# Patient Record
Sex: FEMALE | Race: ASIAN
[De-identification: names, ages, dates, MRNs, and addresses within clinical notes are randomized per-mention and may not be internally consistent; named-entity substitution may affect disease eponyms.]

---

## 2019-10-27 ENCOUNTER — HOSPITAL ENCOUNTER (INPATIENT)
Dept: HOSPITAL 25 - ED | Age: 24
Discharge: HOME | DRG: 897 | End: 2019-10-27
Attending: INTERNAL MEDICINE | Admitting: INTERNAL MEDICINE
Payer: COMMERCIAL

## 2019-10-27 VITALS — SYSTOLIC BLOOD PRESSURE: 120 MMHG | DIASTOLIC BLOOD PRESSURE: 78 MMHG

## 2019-10-27 DIAGNOSIS — E87.6: ICD-10-CM
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
## 2019-10-27 DIAGNOSIS — R41.82: ICD-10-CM

## 2019-10-27 DIAGNOSIS — F10.129: Primary | ICD-10-CM

## 2019-10-27 LAB
ALBUMIN SERPL BCG-MCNC: 3.9 G/DL (ref 3.2–5.2)
ALBUMIN SERPL BCG-MCNC: 4.2 G/DL (ref 3.2–5.2)
ALBUMIN/GLOB SERPL: 1.5 {RATIO} (ref 1–3)
ALBUMIN/GLOB SERPL: 1.6 {RATIO} (ref 1–3)
ALP SERPL-CCNC: 61 U/L (ref 34–104)
ALP SERPL-CCNC: 63 U/L (ref 34–104)
ALT SERPL W P-5'-P-CCNC: 11 U/L (ref 7–52)
ALT SERPL W P-5'-P-CCNC: 12 U/L (ref 7–52)
ANION GAP SERPL CALC-SCNC: 10 MMOL/L (ref 2–11)
ANION GAP SERPL CALC-SCNC: 7 MMOL/L (ref 2–11)
APAP SERPL-MCNC: < 15 MCG/ML
AST SERPL-CCNC: 20 U/L (ref 13–39)
AST SERPL-CCNC: 22 U/L (ref 13–39)
BASOPHILS # BLD AUTO: 0 10^3/UL (ref 0–0.2)
BASOPHILS # BLD AUTO: 0.1 10^3/UL (ref 0–0.2)
BUN SERPL-MCNC: 11 MG/DL (ref 6–24)
BUN SERPL-MCNC: 7 MG/DL (ref 6–24)
BUN/CREAT SERPL: 13 (ref 8–20)
BUN/CREAT SERPL: 16.9 (ref 8–20)
CALCIUM SERPL-MCNC: 7.7 MG/DL (ref 8.6–10.3)
CALCIUM SERPL-MCNC: 9 MG/DL (ref 8.6–10.3)
CHLORIDE SERPL-SCNC: 103 MMOL/L (ref 101–111)
CHLORIDE SERPL-SCNC: 108 MMOL/L (ref 101–111)
EOSINOPHIL # BLD AUTO: 0 10^3/UL (ref 0–0.6)
EOSINOPHIL # BLD AUTO: 0.1 10^3/UL (ref 0–0.6)
GLOBULIN SER CALC-MCNC: 2.4 G/DL (ref 2–4)
GLOBULIN SER CALC-MCNC: 2.8 G/DL (ref 2–4)
GLUCOSE SERPL-MCNC: 124 MG/DL (ref 70–100)
GLUCOSE SERPL-MCNC: 165 MG/DL (ref 70–100)
HCG SERPL QL: < 0.6 MIU/ML
HCO3 SERPL-SCNC: 22 MMOL/L (ref 22–32)
HCO3 SERPL-SCNC: 23 MMOL/L (ref 22–32)
HCT VFR BLD AUTO: 34 % (ref 35–47)
HCT VFR BLD AUTO: 35 % (ref 35–47)
HGB BLD-MCNC: 11.3 G/DL (ref 12–16)
HGB BLD-MCNC: 11.5 G/DL (ref 12–16)
LYMPHOCYTES # BLD AUTO: 0.9 10^3/UL (ref 1–4.8)
LYMPHOCYTES # BLD AUTO: 3 10^3/UL (ref 1–4.8)
MAGNESIUM SERPL-MCNC: 1.7 MG/DL (ref 1.9–2.7)
MCH RBC QN AUTO: 27 PG (ref 27–31)
MCH RBC QN AUTO: 28 PG (ref 27–31)
MCHC RBC AUTO-ENTMCNC: 32 G/DL (ref 31–36)
MCHC RBC AUTO-ENTMCNC: 33 G/DL (ref 31–36)
MCV RBC AUTO: 85 FL (ref 80–97)
MCV RBC AUTO: 86 FL (ref 80–97)
MONOCYTES # BLD AUTO: 0.1 10^3/UL (ref 0–0.8)
MONOCYTES # BLD AUTO: 0.4 10^3/UL (ref 0–0.8)
NEUTROPHILS # BLD AUTO: 3.7 10^3/UL (ref 1.5–7.7)
NEUTROPHILS # BLD AUTO: 5.6 10^3/UL (ref 1.5–7.7)
NRBC # BLD AUTO: 0 10^3/UL
NRBC # BLD AUTO: 0 10^3/UL
NRBC BLD QL AUTO: 0
NRBC BLD QL AUTO: 0
PLATELET # BLD AUTO: 179 10^3/UL (ref 150–450)
PLATELET # BLD AUTO: 251 10^3/UL (ref 150–450)
POTASSIUM SERPL-SCNC: 3.4 MMOL/L (ref 3.5–5)
POTASSIUM SERPL-SCNC: 4.2 MMOL/L (ref 3.5–5)
PROT SERPL-MCNC: 6.3 G/DL (ref 6.4–8.9)
PROT SERPL-MCNC: 7 G/DL (ref 6.4–8.9)
RBC # BLD AUTO: 3.99 10^6 /UL (ref 3.7–4.87)
RBC # BLD AUTO: 4.19 10^6 /UL (ref 3.7–4.87)
SALICYLATES SERPL-MCNC: < 2.5 MG/DL (ref ?–30)
SODIUM SERPL-SCNC: 135 MMOL/L (ref 135–145)
SODIUM SERPL-SCNC: 138 MMOL/L (ref 135–145)
TSH SERPL-ACNC: 4.59 MCIU/ML (ref 0.34–5.6)
VIT C UR QL: (no result)
WBC # BLD AUTO: 6.7 10^3/UL (ref 3.5–10.8)
WBC # BLD AUTO: 7.2 10^3/UL (ref 3.5–10.8)

## 2019-10-27 PROCEDURE — 82803 BLOOD GASES ANY COMBINATION: CPT

## 2019-10-27 PROCEDURE — 80076 HEPATIC FUNCTION PANEL: CPT

## 2019-10-27 PROCEDURE — 96361 HYDRATE IV INFUSION ADD-ON: CPT

## 2019-10-27 PROCEDURE — 0BH17EZ INSERTION OF ENDOTRACHEAL AIRWAY INTO TRACHEA, VIA NATURAL OR ARTIFICIAL OPENING: ICD-10-PCS | Performed by: INTERNAL MEDICINE

## 2019-10-27 PROCEDURE — 87641 MR-STAPH DNA AMP PROBE: CPT

## 2019-10-27 PROCEDURE — 71045 X-RAY EXAM CHEST 1 VIEW: CPT

## 2019-10-27 PROCEDURE — 80053 COMPREHEN METABOLIC PANEL: CPT

## 2019-10-27 PROCEDURE — 80048 BASIC METABOLIC PNL TOTAL CA: CPT

## 2019-10-27 PROCEDURE — 84702 CHORIONIC GONADOTROPIN TEST: CPT

## 2019-10-27 PROCEDURE — 85025 COMPLETE CBC W/AUTO DIFF WBC: CPT

## 2019-10-27 PROCEDURE — 83735 ASSAY OF MAGNESIUM: CPT

## 2019-10-27 PROCEDURE — 84443 ASSAY THYROID STIM HORMONE: CPT

## 2019-10-27 PROCEDURE — G0480 DRUG TEST DEF 1-7 CLASSES: HCPCS

## 2019-10-27 PROCEDURE — 96374 THER/PROPH/DIAG INJ IV PUSH: CPT

## 2019-10-27 PROCEDURE — 80307 DRUG TEST PRSMV CHEM ANLYZR: CPT

## 2019-10-27 PROCEDURE — 96375 TX/PRO/DX INJ NEW DRUG ADDON: CPT

## 2019-10-27 PROCEDURE — 80320 DRUG SCREEN QUANTALCOHOLS: CPT

## 2019-10-27 PROCEDURE — 81003 URINALYSIS AUTO W/O SCOPE: CPT

## 2019-10-27 PROCEDURE — 5A1935Z RESPIRATORY VENTILATION, LESS THAN 24 CONSECUTIVE HOURS: ICD-10-PCS | Performed by: INTERNAL MEDICINE

## 2019-10-27 PROCEDURE — 36415 COLL VENOUS BLD VENIPUNCTURE: CPT

## 2019-10-27 PROCEDURE — 83605 ASSAY OF LACTIC ACID: CPT

## 2019-10-27 PROCEDURE — 93005 ELECTROCARDIOGRAM TRACING: CPT

## 2019-10-27 PROCEDURE — 80329 ANALGESICS NON-OPIOID 1 OR 2: CPT

## 2019-10-27 PROCEDURE — 99285 EMERGENCY DEPT VISIT HI MDM: CPT

## 2019-10-27 NOTE — HP
ADMISSION HISTORY AND PHYSICAL:

 

DATE OF ADMISSION:  10/27/19

 

CHIEF COMPLAINT:  Altered mental status.

 

HISTORY OF PRESENT ILLNESS:  This patient was intubated and there was no family 
member and there is no phone number on the chart, so history was obtained by 
reviewing the ED records.  The patient is a 24-year-old female, who arrived via 
private vehicle highly intoxicated, was not responsible to sternal rub and 
further history could not be obtained.  She was very critical and was seen by 
the ER physician who immediately intubated her. The patient is a [employer] 
student.  Apparently, initially her friend came with the patient but then now 
there is no friend at bedside.  No further history could be obtained at this 
point as the patient is intubated.

 

PAST MEDICAL HISTORY:  Unable to obtain given the patient is intubated and 
there is no friend at bedside.

 

ALLERGIES:  Unable to get and none documented.

 

FAMILY HISTORY:  Unable to obtain.

 

SOCIAL HISTORY:  Unable to obtain.

 

REVIEW OF SYSTEMS:  Unable to obtain.

 

                               PHYSICAL EXAMINATION

 

GENERAL:  The patient was sedated on propofol, required 2 restrains as she was 
very agitated at times.

 

VITAL SIGNS:  BP was noted to be 106/74, heart rate 66, respiration rate 16, 
saturating 100% on ventilator with FiO2 of 25% with end-tidal CO2 of 38.  BMI 
was noted to be 15.1

 

MENTAL STATUS:  The patient was sedated, on the vent.

 

HEAD AND NECK:  Atraumatic, normocephalic.  Bilateral pupils were reactive.  
Oral mucosa was dry.

 

NECK:  Supple.  No jugular venous distention.

 

LUNGS:  Clear to auscultation bilaterally.  No wheezing, rhonchi, or rales.

 

HEART:  S1, S2.  Regular rate and rhythm.

 

ABDOMEN:  Soft, nontender, nondistended.

 

EXTREMITIES:  No cyanosis, clubbing, or edema.

 

 DIAGNOSTIC STUDIES/LABORATORY DATA:  CBC was unremarkable except for some mild 
anemia with hemoglobin of 11.5.  ABG initially showed pH of 7.28, pCO2 of 41, 
PaO2 of 184, and oxygen saturation of 98.9 on continuous mechanical 
ventilation. Comprehensive metabolic panel shows low potassium of 3.4, bicarb 
was noted to be low end of normal at 22, creatinine normal at 0.65, BUN was 
noted to be 11.  Random glucose was noted to be elevated at 124, lactic acid 
was noted to be 2.9.  LFTs unremarkable.  Beta hCG was noted to be 
undetectable.  TSH was noted to be normal. Urinalysis shows trace ketones, 
negative for any leuk esterase or nitrite.  There is ascorbic acid present.  
Toxicology shows serum alcohol level of 399, Tylenol level was undetectable, 
and salicylate level was undetectable.  Urine drug screen was noted to be 
negative.

 

Portable chest x-ray postintubation shows ET tube to be almost roughly 8 to 9 
cm above the christina, but otherwise clear lung fields.

 

EKG shows sinus bradycardia at 47 beats per minute.

 

IMPRESSION:  This is a 24-year-old female, appears to be anorexic, came in with 
altered mental status in highly intoxicated state with serum alcohol level of 
399. There is no history that could be obtained at this point as none of her 
friends are no longer anywhere around the bedside, needed to be intubated for 
airway protection.

 

ASSESSMENT AND PLAN:

1.  Alerted mental status secondary to acute intoxication, intubated for airway 
protection, we will continue with vent management.  We will advanced the ET 
tube another 2 cm as the ET tube was very high on the chest x-ray.  We will 
repeat lactic acid level as this was elevated originally and start the patient 
on WAM protocol.  We will also start the patient on banana bag and IV thiamine.
  We will attempt weaning once the patient is more arousable and attempt 
weaning.

2.  Alcohol intoxication, questionable if the patient is just on acute 
alcoholic versus a chronic drinker.  Although the AST/ALT ratio does not show 
any elevation, we will repeat the LFTs for morning lab to see if there is any 
change.  In the meantime, we will start the patient on a WAM protocol, although 
I suspect this is a one time thing and the patient is not chronically addicted 
based on her LFTs.

3.  Hypokalemia, we will replace potassium via IV.

4.  DVT prophylaxis with sequential compression device.

5.  Code status:  Full code.  Given her young age, cannot find any surrogate 
decision maker at this point.  We will try to obtain more information from 
[place] in the morning or if the patient is more awake and following commands.

 

 

 

[id number] #: [id number]

[facility]

## 2019-10-27 NOTE — ED
Substance Abuse/Use





- HPI Summary


HPI Summary: 


LEVEL 5 CAVEAT: HPI UNOBTAINABLE DUE TO PATIENT UNRESPONSIVE. 











This patient is a 24 year old female presenting to [facility] with a chief complaint 

of unresponsiveness secondary to ETOH intoxication. The patient was brought in 

by private vehicle on responsive and the friend stated she drank too much too 

fast. 





- History Of Current Complaint


Stated Complaint: ETOH PER FRIEND


Hx Obtained From: Other: - Friend


Hx From Patient Unobtainable Due To: Altered Mental Status





- Allergies/Home Medications


Allergies/Adverse Reactions: 


 Allergies











Allergy/AdvReac Type Severity Reaction Status Date / Time


 


Unable to Assess Allergy   Verified 10/27/19 01:20











Home Medications: 


 Home Medications





NK [No Home Medications Reported]  10/27/19 [History Confirmed 10/27/19]











PMH/Surg Hx/FS Hx/Imm Hx





- Additional Comments


History Additional Comments: 





LEVEL 5 CAVEAT: PMH UNOBTAINABLE DUE TO PATIENT UNRESPONSIVE.





Review of Systems





- ROS Summary


Review of Systems Summary: 





LEVEL 5 CAVEAT: ROS UNOBTAINABLE DUE TO PATIENT UNRESPONSIVE.


Neurological: Other - Unresponsive


All Other Systems Reviewed And Are Negative: No





Physical Exam





- Summary


Physical Exam Summary: 








General: Extremely thin  FEMALE. Unresponsive. No response to sternal rub. 

Respirations 10. HR 60 BPM. Unable to assess Neurological or Psych. 


HEENT: Normocephalic, Atraumatic. 


              Eyes: Conjuctiva normal, Pupils 3mm bilaterally, sluggish. 


              Ears: TMs within normal limits.


              Nares: (-) discharge, (-) erythema.


              Oropharynx: Clear, mucous membranes moist, (-) exudates. 


Neck: Soft, FROM, (-) lymphadenopathy, (-) thyromegaly, (-) JVD.


Cardiovascular: Normal sinus rhythm, (-) murmur.


Lungs: Clear to auscultation bilaterally (-) wheezes, (-) rales, (-) rhonchi.


Abdomen: Soft, non-tender, non-distended, (-) organomegaly, normal bowel sounds.


Back: (-) CVA tenderness


Extremities: No edema.


Skin: Warm, dry, (-) rash.


Neuro: Unable to assess Neurological or Psych. 


Psychiatric: Unable to assess Neurological or Psych. 


GCS: 5 


1 Eye-opening Response 1 Verbal Response 3 Motor Response 


Triage Information Reviewed: Yes


Vital Signs On Initial Exam: 





 











Temp Pulse Resp BP Pulse Ox


 


 96.1 F   105   12   92/51   98 


 


 10/27/19 01:25  10/27/19 01:25  10/27/19 01:25  10/27/19 01:25  10/27/19 01:25











Vital Signs Reviewed: Yes





Procedures





- Sedation


Patient Received Moderate/Deep Sedation with Procedure: No





- Intubation


Time of Intubation: 01:17


Intubation Method: orotracheal


Tube Size (cm): 7.0


Medications: Succinylcholine - 60 mg


Breath Sounds after Intubation: equal


Intubation Complications: vomited - 2nd attempt successful


Post Intubation Xray: Yes


Progress/Xray Impression: Good placement noted. 





Diagnostics





- Vital Signs





 











Temp Pulse Resp BP Pulse Ox


 


 96.1 F   105   12   92/51   98 


 


 10/27/19 01:25  10/27/19 01:25  10/27/19 01:25  10/27/19 01:25  10/27/19 01:25














- Laboratory


Result Diagrams: 


 10/27/19 01:17





 10/27/19 01:17


Lab Statement: Any lab studies that have been ordered have been reviewed, and 

results considered in the medical decision making process.





- Radiology


  ** CXR


Radiology Interpretation Completed By: ED Physician


Summary of Radiographic Findings: AT Tube is acceptable in placement. No signs 

of infiltrate or fluid. Pending official radiologist report.





- EKG


  ** 0250


Cardiac Rate: Bradycardia - 47 BPM


EKG Rhythm: Sinus Rhythm


Summary of EKG Findings: No STEMI. ED Physician has reviewed and interpreted 

this EKG.





Re-Evaluation





- Re-Evaluation


  ** First Eval


Re-Evaluation Time: 02:23


Comment: Increased Propofol to 15 mg. Ativan prn.





Course/Dx





- Course


Course Of Treatment: 24-year-old female presents by private vehicle 

unresponsive.  Patient unresponsive to firm sternal rub.  Decreased 

respirations.  GCS 5.  Patient intubated with succinylcholine.  Started on the 

ventilator.  Patient had vomiting 1 at initial intubation.  Zofran and fluids 

given.  Blood alcohol returned 399.  Patient was sedated with propofol.  

Referred to hospitalist for admission.





- Diagnoses


Provider Diagnoses: 


 Unresponsive episode, Acute alcohol intoxication








- Critical Care Time


Critical Care Time:  min - 77 mins





Discharge ED





- Sign-Out/Discharge


Documenting (check all that apply): Patient Departure - Admission





- Discharge Plan


Condition: Stable


Disposition: ADMITTED TO [facility]





- Billing Disposition and Condition


Condition: STABLE


Disposition: Admitted to [facility]





- Attestation Statements


Document Initiated by Dariuszibvalrei: Yes


Documenting Scribe: Jayme Blount


Provider For Whom Neymar is Documenting (Include Credential): Dariana Beatty MD


Scribe Attestation: 


Jayme ALEXIS, scribed for Dariana Beatty MD on 10/27/19 at 0621. 


Scribe Documentation Reviewed: Yes


Provider Attestation: 


The documentation as recorded by the Jayme brannon accurately 

reflects the service I personally performed and the decisions made by me, 

Dariana Beatty MD


Status of Scribe Document: Viewed

## 2019-10-27 NOTE — DS
DISCHARGE SUMMARY:

 

DATE OF ADMISSION:  10/27/19

 

DATE OF DISCHARGE:  10/27/19

 

HISTORY AND HOSPITAL COURSE:  This patient is a 24-year-old [place] female who was admitted from the
 emergency room last night with acute alcohol intoxication.  The patient was intubated and placed on 
mechanical ventilation with a serum alcohol level of 399.  This morning, the patient was awake and wa
s extubated without incident.  The patient was alert, oriented, and able to ambulate without assistan
ce and therefore discharge was considered appropriate.  The patient was informed about the diagnosis 
and the consequences of continued drinking in that fashion.  The patient is a [employer] student and evangelista hurtado return to her apartment with 2 friends. There are no medications on discharge.

 

FINAL DISCHARGE DIAGNOSIS:  Acute alcohol intoxication.

 

 [id number]/CPS #: [id number]